# Patient Record
(demographics unavailable — no encounter records)

---

## 2018-08-29 NOTE — OPERATIVE REPORT
DATE OF PROCEDURE:  August 29, 2018 



REFERRING PHYSICIAN:  Dr. Joselyn Pederson 



PROCEDURES PERFORMED

1.  Esophagogastroduodenoscopy with biopsies.

2.  Colonoscopy with polypectomy.



INDICATIONS FOR EGD:  Dyspepsia.



INDICATIONS FOR COLONOSCOPY:  Colorectal cancer screening and personal 

history of colon polyps.



MEDICATION:  Patient was done under MAC.  Please see anesthesiologist's 

note.



PROCEDURE:  With the patient in the left lateral decubitus position, the 

flexible fiberoptic Olympus gastroscope was introduced into the esophagus 

under direct visualization without any difficulty.  There was some patchy 

erythema noted in the distal esophagus.  The scope was then advanced with 

ease into the stomach traversing a small sliding hiatal hernia.  Mucosa 

overlying the antrum revealed some patchy erythema and low-grade edema, and 

biopsies were obtained and sent to stain for H. pylori.  Several 

hyperplastic appearing polyps were noted in the body of the stomach.  Some 

were partially excised with the cold biopsy forceps.  Pylorus was of normal 

contour and shape.  It was intubated with ease.  The scope was advanced all 

the way to the 2nd portion of the duodenum.  The scope was then withdrawn 

slowly.  Mucosa overlying the proximal 2nd portion and the duodenal bulb 

appeared to be within normal limits.  The scope was then withdrawn back 

into the stomach and retroflexed.  Mucosa overlying the fundus and the 

cardia grossly appeared to be within normal limits.  The scope was then 

straightened out.  It was subsequently withdrawn.  Patient tolerated the 

procedure well.



IMPRESSION

1.  Distal esophagitis, mild.

2.  Small sliding hiatal hernia.  

3.  Gastritis, biopsied.  Biopsies sent to stain for Helicobacter pylori.

4.  Gastric polyps, body, some partially excised with the cold biopsy 

forceps.



PLAN:  Follow up histology.  Initiate Protonix 40 mg 1 p.o. q.a.m. a.c.  



Patient was then turned.  After adequate lubrication of the anal canal, a 

flexible fiberoptic Olympus colonoscope was inserted into the rectum with 

ease and advanced all the way to the cecum.  It was then withdrawn slowly.  

Mucosa overlying the cecum appeared to be within normal limits.  A single 

diverticulum was noted in the ascending colon.  The transverse and 

descending grossly appeared to be within normal limits.  One polyp was 

snared from the sigmoid colon.  The rectum appeared to be within normal 

limits.  The scope was then retroflexed into the distal rectum and small 

internal hemorrhoids were noted, none of which was actively bleeding.  The 

scope was then straightened out.  The scope was subsequently withdrawn.  

Patient tolerated the procedure well.  



IMPRESSION 

1.  Diverticulosis, minimal.

2.  Sigmoid colon polyp, snared.

3.  Internal hemorrhoids, none actively bleeding.  





PLAN:  Follow up histology.  Initiate high-fiber and low-fat diet.  

Initiate high-fiber supplement.  Patient will need a followup colonoscopy 

in 3-5 years.    









DD:  08/29/2018 09:05

DT:  08/29/2018 09:30

Job#:  [id number]



cc:JOSELYN PEDERSON M.D.